# Patient Record
Sex: FEMALE | Race: OTHER | HISPANIC OR LATINO | URBAN - METROPOLITAN AREA
[De-identification: names, ages, dates, MRNs, and addresses within clinical notes are randomized per-mention and may not be internally consistent; named-entity substitution may affect disease eponyms.]

---

## 2019-01-07 ENCOUNTER — EMERGENCY (EMERGENCY)
Facility: HOSPITAL | Age: 19
LOS: 0 days | Discharge: HOME | End: 2019-01-08
Attending: EMERGENCY MEDICINE | Admitting: EMERGENCY MEDICINE
Payer: MEDICAID

## 2019-01-07 DIAGNOSIS — L29.9 PRURITUS, UNSPECIFIED: ICD-10-CM

## 2019-01-07 DIAGNOSIS — L50.9 URTICARIA, UNSPECIFIED: ICD-10-CM

## 2019-01-07 DIAGNOSIS — R21 RASH AND OTHER NONSPECIFIC SKIN ERUPTION: ICD-10-CM

## 2019-01-07 DIAGNOSIS — Z79.899 OTHER LONG TERM (CURRENT) DRUG THERAPY: ICD-10-CM

## 2019-01-07 PROCEDURE — 99282 EMERGENCY DEPT VISIT SF MDM: CPT

## 2019-01-08 VITALS
OXYGEN SATURATION: 98 % | DIASTOLIC BLOOD PRESSURE: 88 MMHG | SYSTOLIC BLOOD PRESSURE: 146 MMHG | TEMPERATURE: 98 F | RESPIRATION RATE: 17 BRPM | HEART RATE: 91 BPM

## 2019-01-08 VITALS — HEIGHT: 62 IN | WEIGHT: 149.91 LBS

## 2019-01-08 RX ORDER — FAMOTIDINE 10 MG/ML
20 INJECTION INTRAVENOUS ONCE
Qty: 0 | Refills: 0 | Status: COMPLETED | OUTPATIENT
Start: 2019-01-08 | End: 2019-01-08

## 2019-01-08 RX ORDER — FAMOTIDINE 10 MG/ML
1 INJECTION INTRAVENOUS
Qty: 20 | Refills: 0 | OUTPATIENT
Start: 2019-01-08 | End: 2019-01-17

## 2019-01-08 RX ORDER — DIPHENHYDRAMINE HCL 50 MG
1 CAPSULE ORAL
Qty: 20 | Refills: 0 | OUTPATIENT
Start: 2019-01-08 | End: 2019-01-17

## 2019-01-08 RX ORDER — DIPHENHYDRAMINE HCL 50 MG
50 CAPSULE ORAL ONCE
Qty: 0 | Refills: 0 | Status: COMPLETED | OUTPATIENT
Start: 2019-01-08 | End: 2019-01-08

## 2019-01-08 RX ADMIN — Medication 50 MILLIGRAM(S): at 01:38

## 2019-01-08 RX ADMIN — FAMOTIDINE 20 MILLIGRAM(S): 10 INJECTION INTRAVENOUS at 01:38

## 2019-01-08 NOTE — ED ADULT NURSE NOTE - CHPI ED NUR SYMPTOMS NEG
no fever/no confusion/no petechia/no chills/no scaly patches on skin/no decreased eating/drinking/no vomiting/no body aches

## 2019-01-08 NOTE — ED ADULT NURSE NOTE - OBJECTIVE STATEMENT
Patient presents to ED c/o itching and rash x 2 hours PTA. Patient states she was at a friend's house when she noticed bilateral arms became itchy and rash started developing. Patient denies any SOB, difficulty breathing, throat itching, throat swelling. Localized, raised rash to right inner/upper arm near armpit noted. SpO2 99% on room air. Patient offers no other complaints. Patient denies any allergies to food and medications. Denies any new detergent use. No edema of the lips, tongue or throat noted.

## 2019-01-08 NOTE — ED ADULT NURSE NOTE - NSIMPLEMENTINTERV_GEN_ALL_ED
Implemented All Universal Safety Interventions:  [place] to call system. Call bell, personal items and telephone within reach. Instruct patient to call for assistance. Room bathroom lighting operational. Non-slip footwear when patient is off stretcher. Physically safe environment: no spills, clutter or unnecessary equipment. Stretcher in lowest position, wheels locked, appropriate side rails in place.

## 2019-01-08 NOTE — ED PROVIDER NOTE - OBJECTIVE STATEMENT
19 y/o female in ED c/o itchy rash x 2 hours PTA.   pt denies anything new.    pt states was at home with friends and developed itchy rash to face then neck and then BUE.   states improving.    pt denies any fever, HA, cp, sob, n/v/d/abd pain.   tolerating PO.

## 2019-01-08 NOTE — ED PROVIDER NOTE - NSFOLLOWUPINSTRUCTIONS_ED_ALL_ED_FT
please follow up with your doctor in 3-5 days.   take medications as prescribed.   return to ED for any concerns

## 2021-03-29 ENCOUNTER — EMERGENCY (EMERGENCY)
Facility: HOSPITAL | Age: 21
LOS: 0 days | Discharge: HOME | End: 2021-03-29
Attending: EMERGENCY MEDICINE
Payer: COMMERCIAL

## 2021-03-29 VITALS
TEMPERATURE: 98 F | HEART RATE: 80 BPM | RESPIRATION RATE: 18 BRPM | WEIGHT: 190.04 LBS | DIASTOLIC BLOOD PRESSURE: 79 MMHG | HEIGHT: 62 IN | SYSTOLIC BLOOD PRESSURE: 141 MMHG | OXYGEN SATURATION: 99 %
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
## 2021-03-29 DIAGNOSIS — R07.89 OTHER CHEST PAIN: ICD-10-CM

## 2021-03-29 DIAGNOSIS — S39.012A STRAIN OF MUSCLE, FASCIA AND TENDON OF LOWER BACK, INITIAL ENCOUNTER: ICD-10-CM
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
## 2021-03-29 DIAGNOSIS — M54.9 DORSALGIA, UNSPECIFIED: ICD-10-CM

## 2021-03-29 DIAGNOSIS — R10.9 UNSPECIFIED ABDOMINAL PAIN: ICD-10-CM

## 2021-03-29 PROCEDURE — 99283 EMERGENCY DEPT VISIT LOW MDM: CPT

## 2021-03-29 PROCEDURE — 93005 ELECTROCARDIOGRAM TRACING: CPT

## 2021-03-29 PROCEDURE — 99284 EMERGENCY DEPT VISIT MOD MDM: CPT

## 2021-03-29 PROCEDURE — 93010 ELECTROCARDIOGRAM REPORT: CPT

## 2021-03-29 RX ORDER — METHOCARBAMOL 500 MG/1
2 TABLET, FILM COATED ORAL
Qty: 30 | Refills: 0
Start: 2021-03-29 | End: 2021-04-02
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
## 2021-03-29 NOTE — ED ADULT NURSE NOTE - NSIMPLEMENTINTERV_GEN_ALL_ED
Implemented All Universal Safety Interventions:  [place] to call system. Call bell, personal items and telephone within reach. Instruct patient to call for assistance. Room bathroom lighting operational. Non-slip footwear when patient is off stretcher. Physically safe environment: no spills, clutter or unnecessary equipment. Stretcher in lowest position, wheels locked, appropriate side rails in place.
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
## 2021-03-29 NOTE — ED STATDOCS - GASTROINTESTINAL, MLM
OB/GYN Resident Interval Note    Patient seen in recovery. Vitals showing that she is tachycardic in 115s with blood pressures 80-90s/40-60s. She states she is not lightheaded, is not dizzy, denies any chest pain or SOB. Reports she feels cold. Fundus firm, no brisk vaginal bleeding with fundal palpation noted     Estimated blood loss during surgery was approx 900 mL   Will order stat CBC, CMP, PT, PTT, Fibrinogen, LA and start 500 mL LR bolus.        Vitals:    11/30/20 1800 11/30/20 1935 11/30/20 1940 11/30/20 1950   BP: 125/70 (!) 88/41 108/67 101/63   Pulse: 81 104 60 81   Resp: 16 18  18   Temp:  97.4 °F (36.3 °C)     TempSrc:  Oral     SpO2:  99%     Weight:       Height:           Coty Fitzgerald DO   OB/GYN Resident  Pager [phone] [facility]  11/30/2020, 8:25 PM abdomen soft, non-tender, no rebound or guarding, +small ecchymosis below umbilicus
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
## 2021-03-29 NOTE — ED STATDOCS - PATIENT PORTAL LINK FT
You can access the FollowMyHealth Patient Portal offered by [facility] by registering at the following website: [email or web address]. By joining [employer]’s FollowMyHealth portal, you will also be able to view your health information using other applications (apps) compatible with our system.
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
## 2021-08-10 ENCOUNTER — EMERGENCY (EMERGENCY)
Facility: HOSPITAL | Age: 21
LOS: 0 days | Discharge: HOME | End: 2021-08-10
Attending: EMERGENCY MEDICINE
Payer: MEDICAID

## 2021-08-10 VITALS
OXYGEN SATURATION: 98 % | DIASTOLIC BLOOD PRESSURE: 80 MMHG | RESPIRATION RATE: 18 BRPM | HEART RATE: 71 BPM | SYSTOLIC BLOOD PRESSURE: 120 MMHG

## 2021-08-10 VITALS — HEIGHT: 62 IN | WEIGHT: 223.99 LBS

## 2021-08-10 DIAGNOSIS — O26.859 SPOTTING COMPLICATING PREGNANCY, UNSPECIFIED TRIMESTER: ICD-10-CM

## 2021-08-10 DIAGNOSIS — R10.30 LOWER ABDOMINAL PAIN, UNSPECIFIED: ICD-10-CM

## 2021-08-10 LAB
ANION GAP SERPL CALC-SCNC: 6 MMOL/L — SIGNIFICANT CHANGE UP (ref 5–17)
APPEARANCE UR: ABNORMAL
APTT BLD: 30.2 SEC — SIGNIFICANT CHANGE UP (ref 27.5–35.5)
BASOPHILS # BLD AUTO: 0.05 K/UL — SIGNIFICANT CHANGE UP (ref 0–0.2)
BASOPHILS NFR BLD AUTO: 0.4 % — SIGNIFICANT CHANGE UP (ref 0–2)
BILIRUB UR-MCNC: NEGATIVE — SIGNIFICANT CHANGE UP
BUN SERPL-MCNC: 12 MG/DL — SIGNIFICANT CHANGE UP (ref 7–23)
CALCIUM SERPL-MCNC: 9.3 MG/DL — SIGNIFICANT CHANGE UP (ref 8.5–10.1)
CHLORIDE SERPL-SCNC: 105 MMOL/L — SIGNIFICANT CHANGE UP (ref 96–108)
CO2 SERPL-SCNC: 24 MMOL/L — SIGNIFICANT CHANGE UP (ref 22–31)
COLOR SPEC: ABNORMAL
CREAT SERPL-MCNC: 0.45 MG/DL — LOW (ref 0.5–1.3)
DIFF PNL FLD: ABNORMAL
EOSINOPHIL # BLD AUTO: 0.16 K/UL — SIGNIFICANT CHANGE UP (ref 0–0.5)
EOSINOPHIL NFR BLD AUTO: 1.3 % — SIGNIFICANT CHANGE UP (ref 0–6)
GLUCOSE SERPL-MCNC: 93 MG/DL — SIGNIFICANT CHANGE UP (ref 70–99)
GLUCOSE UR QL: NEGATIVE MG/DL — SIGNIFICANT CHANGE UP
HCG SERPL-ACNC: 3721 MIU/ML — HIGH
HCT VFR BLD CALC: 40.4 % — SIGNIFICANT CHANGE UP (ref 34.5–45)
HGB BLD-MCNC: 13.9 G/DL — SIGNIFICANT CHANGE UP (ref 11.5–15.5)
IMM GRANULOCYTES NFR BLD AUTO: 0.3 % — SIGNIFICANT CHANGE UP (ref 0–1.5)
INR BLD: 1.01 RATIO — SIGNIFICANT CHANGE UP (ref 0.88–1.16)
KETONES UR-MCNC: ABNORMAL
LEUKOCYTE ESTERASE UR-ACNC: ABNORMAL
LYMPHOCYTES # BLD AUTO: 18.2 % — SIGNIFICANT CHANGE UP (ref 13–44)
LYMPHOCYTES # BLD AUTO: 2.3 K/UL — SIGNIFICANT CHANGE UP (ref 1–3.3)
MCHC RBC-ENTMCNC: 31 PG — SIGNIFICANT CHANGE UP (ref 27–34)
MCHC RBC-ENTMCNC: 34.4 GM/DL — SIGNIFICANT CHANGE UP (ref 32–36)
MCV RBC AUTO: 90 FL — SIGNIFICANT CHANGE UP (ref 80–100)
MONOCYTES # BLD AUTO: 0.72 K/UL — SIGNIFICANT CHANGE UP (ref 0–0.9)
MONOCYTES NFR BLD AUTO: 5.7 % — SIGNIFICANT CHANGE UP (ref 2–14)
NEUTROPHILS # BLD AUTO: 9.34 K/UL — HIGH (ref 1.8–7.4)
NEUTROPHILS NFR BLD AUTO: 74.1 % — SIGNIFICANT CHANGE UP (ref 43–77)
NITRITE UR-MCNC: POSITIVE
PH UR: 5 — SIGNIFICANT CHANGE UP (ref 5–8)
PLATELET # BLD AUTO: 271 K/UL — SIGNIFICANT CHANGE UP (ref 150–400)
POTASSIUM SERPL-MCNC: 4.1 MMOL/L — SIGNIFICANT CHANGE UP (ref 3.5–5.3)
POTASSIUM SERPL-SCNC: 4.1 MMOL/L — SIGNIFICANT CHANGE UP (ref 3.5–5.3)
PROT UR-MCNC: 100 MG/DL
PROTHROM AB SERPL-ACNC: 11.8 SEC — SIGNIFICANT CHANGE UP (ref 10.6–13.6)
RBC # BLD: 4.49 M/UL — SIGNIFICANT CHANGE UP (ref 3.8–5.2)
RBC # FLD: 12.6 % — SIGNIFICANT CHANGE UP (ref 10.3–14.5)
SODIUM SERPL-SCNC: 135 MMOL/L — SIGNIFICANT CHANGE UP (ref 135–145)
SP GR SPEC: 1.02 — SIGNIFICANT CHANGE UP (ref 1.01–1.02)
UROBILINOGEN FLD QL: NEGATIVE MG/DL — SIGNIFICANT CHANGE UP
WBC # BLD: 12.61 K/UL — HIGH (ref 3.8–10.5)
WBC # FLD AUTO: 12.61 K/UL — HIGH (ref 3.8–10.5)

## 2021-08-10 PROCEDURE — 86901 BLOOD TYPING SEROLOGIC RH(D): CPT

## 2021-08-10 PROCEDURE — 76817 TRANSVAGINAL US OBSTETRIC: CPT | Mod: 26

## 2021-08-10 PROCEDURE — 86850 RBC ANTIBODY SCREEN: CPT

## 2021-08-10 PROCEDURE — 85025 COMPLETE CBC W/AUTO DIFF WBC: CPT

## 2021-08-10 PROCEDURE — 99284 EMERGENCY DEPT VISIT MOD MDM: CPT | Mod: 25

## 2021-08-10 PROCEDURE — 76817 TRANSVAGINAL US OBSTETRIC: CPT

## 2021-08-10 PROCEDURE — 81001 URINALYSIS AUTO W/SCOPE: CPT

## 2021-08-10 PROCEDURE — 86900 BLOOD TYPING SEROLOGIC ABO: CPT

## 2021-08-10 PROCEDURE — 85610 PROTHROMBIN TIME: CPT

## 2021-08-10 PROCEDURE — 36415 COLL VENOUS BLD VENIPUNCTURE: CPT

## 2021-08-10 PROCEDURE — 87086 URINE CULTURE/COLONY COUNT: CPT

## 2021-08-10 PROCEDURE — 85730 THROMBOPLASTIN TIME PARTIAL: CPT

## 2021-08-10 PROCEDURE — 84702 CHORIONIC GONADOTROPIN TEST: CPT

## 2021-08-10 PROCEDURE — 99284 EMERGENCY DEPT VISIT MOD MDM: CPT

## 2021-08-10 PROCEDURE — 87186 SC STD MICRODIL/AGAR DIL: CPT

## 2021-08-10 PROCEDURE — 80048 BASIC METABOLIC PNL TOTAL CA: CPT

## 2021-08-10 RX ORDER — NITROFURANTOIN MACROCRYSTAL 50 MG
100 CAPSULE ORAL
Refills: 0 | Status: DISCONTINUED | OUTPATIENT
Start: 2021-08-10 | End: 2021-08-10

## 2021-08-10 RX ADMIN — Medication 100 MILLIGRAM(S): at 10:45

## 2021-08-10 NOTE — ED PROVIDER NOTE - PATIENT PORTAL LINK FT
You can access the FollowMyHealth Patient Portal offered by [facility] by registering at the following website: [email or web address]. By joining [employer]’s FollowMyHealth portal, you will also be able to view your health information using other applications (apps) compatible with our system.

## 2021-08-10 NOTE — ED ADULT TRIAGE NOTE - CHIEF COMPLAINT QUOTE
c/o abdominal cramping and vaginal bleeding since 8/9/21.  Started with spotting which progressed to more bleeding.  Pt states they are approximately 7 weeks pregnant.  Some abdominal pain noted.

## 2021-08-10 NOTE — ED PROVIDER NOTE - OBJECTIVE STATEMENT
Pt is a 21 year old female who comes to the Ed complaining of lower abdominal cramping and pain. Pt states she also had vaginal bleeding starting two weeks ago. States went to [facility] and was told she was pregnant. States that she has had labs and US several times with Pt is a 21 year old female who comes to the Ed complaining of lower abdominal cramping and pain. Pt states she also had vaginal bleeding/spotting starting two weeks ago.  went to [facility] clinic and was told she was pregnant.  that she has had labs and US several times with last one being yesterday and was told she may be having a miscarriage.  Today had increased bleeding and came for eval. No fevers

## 2021-08-10 NOTE — ED ADULT NURSE NOTE - OBJECTIVE STATEMENT
Pt reports that she had positive home pregnancy test over one month ago. Pt reports that she started spotting and then noted more vaginal bleeding.  Pt denies injury.  Pt reports prior hx of  with subsequent irregular menses.

## 2021-08-13 RX ORDER — NITROFURANTOIN MACROCRYSTAL 50 MG
1 CAPSULE ORAL
Qty: 14 | Refills: 0
Start: 2021-08-13 | End: 2021-08-19

## 2021-08-15 ENCOUNTER — EMERGENCY (EMERGENCY)
Facility: HOSPITAL | Age: 21
LOS: 0 days | Discharge: HOME | End: 2021-08-15
Attending: EMERGENCY MEDICINE
Payer: MEDICAID

## 2021-08-15 VITALS
RESPIRATION RATE: 16 BRPM | DIASTOLIC BLOOD PRESSURE: 57 MMHG | HEART RATE: 69 BPM | SYSTOLIC BLOOD PRESSURE: 119 MMHG | OXYGEN SATURATION: 99 % | TEMPERATURE: 98 F

## 2021-08-15 VITALS — HEIGHT: 62 IN | WEIGHT: 220.02 LBS

## 2021-08-15 DIAGNOSIS — O03.1 DELAYED OR EXCESSIVE HEMORRHAGE FOLLOWING INCOMPLETE SPONTANEOUS ABORTION: ICD-10-CM

## 2021-08-15 DIAGNOSIS — O99.891 OTHER SPECIFIED DISEASES AND CONDITIONS COMPLICATING PREGNANCY: ICD-10-CM

## 2021-08-15 DIAGNOSIS — R10.32 LEFT LOWER QUADRANT PAIN: ICD-10-CM

## 2021-08-15 DIAGNOSIS — R10.9 UNSPECIFIED ABDOMINAL PAIN: ICD-10-CM

## 2021-08-15 DIAGNOSIS — O03.4 INCOMPLETE SPONTANEOUS ABORTION WITHOUT COMPLICATION: ICD-10-CM

## 2021-08-15 DIAGNOSIS — Z91.040 LATEX ALLERGY STATUS: ICD-10-CM

## 2021-08-15 DIAGNOSIS — R11.0 NAUSEA: ICD-10-CM

## 2021-08-15 LAB
ALBUMIN SERPL ELPH-MCNC: 4 G/DL — SIGNIFICANT CHANGE UP (ref 3.3–5)
ALP SERPL-CCNC: 57 U/L — SIGNIFICANT CHANGE UP (ref 40–120)
ALT FLD-CCNC: 37 U/L — SIGNIFICANT CHANGE UP (ref 12–78)
ANION GAP SERPL CALC-SCNC: 6 MMOL/L — SIGNIFICANT CHANGE UP (ref 5–17)
AST SERPL-CCNC: 18 U/L — SIGNIFICANT CHANGE UP (ref 15–37)
BASOPHILS # BLD AUTO: 0.05 K/UL — SIGNIFICANT CHANGE UP (ref 0–0.2)
BASOPHILS NFR BLD AUTO: 0.3 % — SIGNIFICANT CHANGE UP (ref 0–2)
BILIRUB SERPL-MCNC: 0.4 MG/DL — SIGNIFICANT CHANGE UP (ref 0.2–1.2)
BUN SERPL-MCNC: 11 MG/DL — SIGNIFICANT CHANGE UP (ref 7–23)
CALCIUM SERPL-MCNC: 8.9 MG/DL — SIGNIFICANT CHANGE UP (ref 8.5–10.1)
CHLORIDE SERPL-SCNC: 105 MMOL/L — SIGNIFICANT CHANGE UP (ref 96–108)
CO2 SERPL-SCNC: 27 MMOL/L — SIGNIFICANT CHANGE UP (ref 22–31)
CREAT SERPL-MCNC: 0.55 MG/DL — SIGNIFICANT CHANGE UP (ref 0.5–1.3)
EOSINOPHIL # BLD AUTO: 0.18 K/UL — SIGNIFICANT CHANGE UP (ref 0–0.5)
EOSINOPHIL NFR BLD AUTO: 1.2 % — SIGNIFICANT CHANGE UP (ref 0–6)
GLUCOSE SERPL-MCNC: 99 MG/DL — SIGNIFICANT CHANGE UP (ref 70–99)
HCG SERPL-ACNC: 1703 MIU/ML — HIGH
HCT VFR BLD CALC: 37.9 % — SIGNIFICANT CHANGE UP (ref 34.5–45)
HGB BLD-MCNC: 13 G/DL — SIGNIFICANT CHANGE UP (ref 11.5–15.5)
IMM GRANULOCYTES NFR BLD AUTO: 0.3 % — SIGNIFICANT CHANGE UP (ref 0–1.5)
LYMPHOCYTES # BLD AUTO: 1.61 K/UL — SIGNIFICANT CHANGE UP (ref 1–3.3)
LYMPHOCYTES # BLD AUTO: 11 % — LOW (ref 13–44)
MCHC RBC-ENTMCNC: 31 PG — SIGNIFICANT CHANGE UP (ref 27–34)
MCHC RBC-ENTMCNC: 34.3 GM/DL — SIGNIFICANT CHANGE UP (ref 32–36)
MCV RBC AUTO: 90.5 FL — SIGNIFICANT CHANGE UP (ref 80–100)
MONOCYTES # BLD AUTO: 0.66 K/UL — SIGNIFICANT CHANGE UP (ref 0–0.9)
MONOCYTES NFR BLD AUTO: 4.5 % — SIGNIFICANT CHANGE UP (ref 2–14)
NEUTROPHILS # BLD AUTO: 12.03 K/UL — HIGH (ref 1.8–7.4)
NEUTROPHILS NFR BLD AUTO: 82.7 % — HIGH (ref 43–77)
PLATELET # BLD AUTO: 264 K/UL — SIGNIFICANT CHANGE UP (ref 150–400)
POTASSIUM SERPL-MCNC: 4.3 MMOL/L — SIGNIFICANT CHANGE UP (ref 3.5–5.3)
POTASSIUM SERPL-SCNC: 4.3 MMOL/L — SIGNIFICANT CHANGE UP (ref 3.5–5.3)
PROT SERPL-MCNC: 7.1 GM/DL — SIGNIFICANT CHANGE UP (ref 6–8.3)
RBC # BLD: 4.19 M/UL — SIGNIFICANT CHANGE UP (ref 3.8–5.2)
RBC # FLD: 12.5 % — SIGNIFICANT CHANGE UP (ref 10.3–14.5)
SODIUM SERPL-SCNC: 138 MMOL/L — SIGNIFICANT CHANGE UP (ref 135–145)
WBC # BLD: 14.58 K/UL — HIGH (ref 3.8–10.5)
WBC # FLD AUTO: 14.58 K/UL — HIGH (ref 3.8–10.5)

## 2021-08-15 PROCEDURE — 86900 BLOOD TYPING SEROLOGIC ABO: CPT

## 2021-08-15 PROCEDURE — 86901 BLOOD TYPING SEROLOGIC RH(D): CPT

## 2021-08-15 PROCEDURE — U0005: CPT

## 2021-08-15 PROCEDURE — 80053 COMPREHEN METABOLIC PANEL: CPT

## 2021-08-15 PROCEDURE — 84702 CHORIONIC GONADOTROPIN TEST: CPT

## 2021-08-15 PROCEDURE — 86850 RBC ANTIBODY SCREEN: CPT

## 2021-08-15 PROCEDURE — 85025 COMPLETE CBC W/AUTO DIFF WBC: CPT

## 2021-08-15 PROCEDURE — 76817 TRANSVAGINAL US OBSTETRIC: CPT | Mod: 26

## 2021-08-15 PROCEDURE — 96374 THER/PROPH/DIAG INJ IV PUSH: CPT

## 2021-08-15 PROCEDURE — 76817 TRANSVAGINAL US OBSTETRIC: CPT

## 2021-08-15 PROCEDURE — 99284 EMERGENCY DEPT VISIT MOD MDM: CPT | Mod: 25

## 2021-08-15 PROCEDURE — 36415 COLL VENOUS BLD VENIPUNCTURE: CPT

## 2021-08-15 PROCEDURE — 96376 TX/PRO/DX INJ SAME DRUG ADON: CPT

## 2021-08-15 PROCEDURE — U0003: CPT

## 2021-08-15 PROCEDURE — 99285 EMERGENCY DEPT VISIT HI MDM: CPT

## 2021-08-15 PROCEDURE — 96361 HYDRATE IV INFUSION ADD-ON: CPT

## 2021-08-15 PROCEDURE — 99283 EMERGENCY DEPT VISIT LOW MDM: CPT

## 2021-08-15 RX ORDER — KETOROLAC TROMETHAMINE 30 MG/ML
30 SYRINGE (ML) INJECTION ONCE
Refills: 0 | Status: DISCONTINUED | OUTPATIENT
Start: 2021-08-15 | End: 2021-08-15

## 2021-08-15 RX ORDER — SODIUM CHLORIDE 9 MG/ML
1000 INJECTION INTRAMUSCULAR; INTRAVENOUS; SUBCUTANEOUS ONCE
Refills: 0 | Status: COMPLETED | OUTPATIENT
Start: 2021-08-15 | End: 2021-08-15

## 2021-08-15 RX ORDER — IBUPROFEN 200 MG
1 TABLET ORAL
Qty: 6 | Refills: 0
Start: 2021-08-15 | End: 2021-08-16

## 2021-08-15 RX ORDER — MISOPROSTOL 200 UG/1
3 TABLET ORAL
Qty: 6 | Refills: 0
Start: 2021-08-15 | End: 2021-08-15

## 2021-08-15 RX ADMIN — SODIUM CHLORIDE 1000 MILLILITER(S): 9 INJECTION INTRAMUSCULAR; INTRAVENOUS; SUBCUTANEOUS at 11:25

## 2021-08-15 RX ADMIN — SODIUM CHLORIDE 1000 MILLILITER(S): 9 INJECTION INTRAMUSCULAR; INTRAVENOUS; SUBCUTANEOUS at 10:24

## 2021-08-15 RX ADMIN — Medication 30 MILLIGRAM(S): at 15:30

## 2021-08-15 RX ADMIN — Medication 30 MILLIGRAM(S): at 11:01

## 2021-08-15 NOTE — ED PROVIDER NOTE - PATIENT PORTAL LINK FT
You can access the FollowMyHealth Patient Portal offered by [facility] by registering at the following website: [email or web address]. By joining [employer]’s FollowMyHealth portal, you will also be able to view your health information using other applications (apps) compatible with our system.

## 2021-08-15 NOTE — CONSULT NOTE ADULT - ATTENDING COMMENTS
22 yo  with incomplete Ab.  Dx with Missed Ab ~ 5 days ago.  HCG downtrending.  Patient presented to ED secondary to heavy vaginal bleeding.  Reports bleeding is now minimal.  Thickened endometrium on today's Us.    Vital signs normal and stable h&h.    pelvic exam unremarkable.  Vaginal vault without blood clots and no active bleeding from cervix.    I agree with resident assessment and plan.    Discharge patient home with cytotec for medical curettage.  ER warnings given.  Follow up with [facility]         Mamadou Riley MD

## 2021-08-15 NOTE — ED PROVIDER NOTE - CARE PROVIDER_API CALL
Nandini Bose)  Obstetrics and Gynecology  [address] [place] [address]  Phone: [phone]  Fax: [phone]  Follow Up Time:

## 2021-08-15 NOTE — ED ADULT NURSE NOTE - OBJECTIVE STATEMENT
pt arrives to ED complaining of abdominal pain with vaginal bleeding. pt told she is having a miscarraige. pt denies cp, sob, lightheaded, dizziness. pain described as sharp, constant. alert and oriented x 4. ambulatory.

## 2021-08-15 NOTE — CONSULT NOTE ADULT - ASSESSMENT
20 yo Female  presents to the ED with vaginal bleeding, seen in [facility] ED 5 days ago, diagnosed with miscarriage, returns to ED with worsening vaginal bleeding.  -Patient hemodynamically stable, /79, H/H 13.0/37.9  -Pelvic pain improving s/p Toradol in ED  -Cytotec to progress miscarriage  -Ibuprofen 600mg q6h PRN for moderate pain upon discharge  -Follow up with OBGYN within 1* week after discharge    **INCOMPLETE 20 yo Female  presents to the ED with vaginal bleeding, seen in [facility] ED 5 days ago, diagnosed with miscarriage, returns to ED with worsening vaginal bleeding.  -b-HCG downtrending from 5 days ago, repeat transvaginal US consistent with miscarriage  -Patient hemodynamically stable, /79, H/H 13.0/37.9  -Pelvic pain improving s/p Toradol in ED  -Cytotec to progress miscarriage  -Ibuprofen 600mg q6h PRN for moderate pain upon discharge  -Follow up with OBGYN within 1* week after discharge    **INCOMPLETE 22 yo Female  presents to the ED with vaginal bleeding, seen in [facility] ED 5 days ago, diagnosed with miscarriage, returns to ED with worsening vaginal bleeding.  -b-HCG downtrending from 5 days ago, repeat transvaginal US consistent with miscarriage  -Patient hemodynamically stable, /79, H/H 13.0/37.9  -Prescription sent for Cytotec 600mcg buccally or vaginally to progress miscarriage, instructions given to patient to place at home  -Prescription sent for Ibuprofen 800mg q8h PRN for moderate pain upon discharge  -Information for [facility] given to patient  -Patient optimized from OBGYN standpoint for discharge home with close outpatient follow up  -Discussed with Attending Dr. Riley      20 yo Female  presents to the ED with vaginal bleeding, seen in [facility] ED 5 days ago, diagnosed with miscarriage, returns to ED with worsening vaginal bleeding.  -b-HCG downtrending from 5 days ago, repeat transvaginal US consistent with miscarriage  -Patient hemodynamically stable, /79, H/H 13.0/37.9  -Prescription sent for Cytotec 600mcg buccally or vaginally to progress miscarriage, instructions given to patient to place at home  -Prescription sent for Ibuprofen 800mg q8h PRN for moderate pain upon discharge  -Information for [facility] given to patient  -Patient optimized from OBGYN standpoint for discharge home with close outpatient follow up  -Discussed with Attending Dr. Riley, agrees with plan

## 2021-08-15 NOTE — ED PROVIDER NOTE - NSFOLLOWUPINSTRUCTIONS_ED_ALL_ED_FT
Tylenol 650 mg every 6 hours as needed for pain.  Take Cytotec as prescribed.  Follow up [facility]: call office tomorrow for appointment.  Return to ED if fever, severe abdominal/pelvic pain, severe bleeding from vagina, passing out, or other concern.      Incomplete Miscarriage      A miscarriage is the loss of an unborn baby (fetus) before the 20th week of pregnancy. In an incomplete miscarriage, parts of the fetus or placenta (afterbirth) remain in the body. Most miscarriages happen in the first 3 months of pregnancy. Sometimes, it happens before a woman even knows she is pregnant.    Having a miscarriage can be an emotional experience. If you have had a miscarriage, talk with your health care provider about any questions you may have about miscarrying, the grieving process, and your future pregnancy plans.      What are the causes?  This condition may be caused by:  •Problems with the genes or chromosomes that make it impossible for the baby to develop normally. These problems are most often the result of random errors that occur early in development, and are not passed from parent to child (not inherited).      •Infection of the cervix or uterus.      •Conditions that affect hormone balance in the body.      •Problems with the cervix, such as the cervix opening and thinning before pregnancy is at term (cervical insufficiency).      •Problems with the uterus, such as a uterus with an abnormal shape, fibroids in the uterus, or problems that were present from birth (congenital abnormalities).      •Certain medical conditions.      •Smoking, drinking alcohol, or using drugs.      •Injury (trauma).      Many times, the cause of a miscarriage is not known.      What are the signs or symptoms?  Symptoms of this condition include:  •Vaginal bleeding or spotting, with or without cramps or pain.      •Pain or cramping in the abdomen or lower back.      •Passing fluid, tissue, or blood clots from the vagina.        How is this diagnosed?  This condition may be diagnosed based on:  •A physical exam.      •Ultrasound.      •Blood tests.      •Urine tests.        How is this treated?  An incomplete miscarriage may be treated with:  •Dilation and curettage (D&C). This is a procedure in which the cervix is stretched open and the lining of the uterus (endometrium) is scraped to remove any remaining tissue from the pregnancy.    •Medicines, such as:  •Antibiotic medicine to treat infection.      •Medicine to help any remaining tissue pass out of your uterus.      •Medicine to reduce (contract) the size of the uterus. These medicines may be given if you have a lot of bleeding.        If you have Rh negative blood and your baby was Rh positive, you will need a shot of medicine called Rh immunoglobulinto protect future babies from Rh blood problems. "Rh-negative" and "Rh-positive" refer to whether or not the blood has a specific protein found on the surface of red blood cells (Rh factor).      Follow these instructions at home:      Medicines      •Take over-the-counter and prescription medicines only as told by your health care provider.      •If you were prescribed antibiotic medicine, take your antibiotic as told by your health care provider. Do not stop taking the antibiotic even if you start to feel better.      • Do not take NSAIDs, such as aspirin and ibuprofen, unless approved by your doctor. These medicines can cause bleeding.      Activity     •Rest as directed. Ask your health care provider what activities are safe for you.      •Have someone help with home and family responsibilities during this time.      General instructions     •Keep track of the number of sanitary pads you use each day and how soaked (saturated) they are. Write down this information.      •Monitor the amount of tissue or blood clots that you pass from your vagina. Save any large amounts of tissue for your health care provider to examine.      • Do not use tampons, douche, or have sex until your health care provider approves.      •To help you and your partner with the process of grieving, talk with your health care provider or seek counseling to help cope with the pregnancy loss.      •When you are ready, meet with your health care provider to discuss important steps you should take for your health, as well as steps to take in order to have a healthy pregnancy in the future.      •Keep all follow-up visits as told by your health care provider. This is important.        Where to find more information    •The American Congress of Obstetricians and Gynecologists: www.acog.org      •[place] Department of Health and Human Services Office of Women’s Health: www.womenshealth.gov        Contact a health care provider if:    •You have a fever or chills.      •You have a foul smelling vaginal discharge.        Get help right away if:    •You have severe cramps or pain in your back or abdomen.      •You pass walnut-sized (or larger) blood clots or tissue from your vagina.      •You have heavy bleeding, soaking more than 1 regular sanitary pad in an hour.      •You become lightheaded or weak.      •You pass out.      •You have feelings of sadness that take over your thoughts, or you have thoughts of hurting yourself.        Summary    •In an incomplete miscarriage, parts of the fetus or placenta (afterbirth) remain in the body.      •There are multiple treatment options for an incomplete miscarriage, talk to your health care provider about the best option for you.      •Follow your health care provider's instructions for follow-up care.      •To help you and your partner with the process of grieving, talk with your health care provider or seek counseling to help cope with the pregnancy loss.      This information is not intended to replace advice given to you by your health care provider. Make sure you discuss any questions you have with your health care provider.

## 2021-08-15 NOTE — ED PROVIDER NOTE - ENMT, MLM
Oral pharynx clear. Airway patent, Nasal mucosa clear. Mouth with mildly dry mucosa. Throat has no vesicles, no oropharyngeal exudates and uvula is midline.

## 2021-08-15 NOTE — CONSULT NOTE ADULT - SUBJECTIVE AND OBJECTIVE BOX
HERNAN FORD  21y Female     Patient is a 21y old Female who presents with a chief complaint of vaginal bleeding.    HPI: 22 yo Female  presents to the ED with vaginal bleeding.  8 days ago patient noted slight vaginal spotting that has continued to increase. 5 days ago patient was evaluated at [facility] ED and diagnosed with a miscarriage, OBGYN was not consulted at that time, patient told to follow up with OBGYN outpatient. Patient states she has not yet followed up. Since discharge, vaginal bleeding has progressively worsened. Patient states for past 3 days she has used >10 saturated pads over 24 hour period. States when she goes to the bathroom the whole toilet is filled with blood. Patient denies any inciting event prior to vaginal bleeding starting. This AM patient awakened with sharp left pelvic pain with associated nausea. Patient decided to return to the ED to be evaluated. Patient currently feels improved, states the pain medication she received in the ED has helped her. Denies any other complaints. Denies CP, SOB, nausea, vomiting, diarrhea, HA, changes in vision, dizziness, lightheadedness. Of note, patient was also diagnosed with UTI in ED 5 days ago, prescribed course of Macrobid 100mg BID, has been taking it for the past 3 days. Denies taking any other medications regularly.      PAST MEDICAL & SURGICAL HISTORY:  No pertinent past medical or surgical history        OB/GYN HISTORY:     , patient had an elective  at age 16  Menarche age 13, cycle from age [age] was regular every 28 days, period lasting 5-7 days, after elective  patient reports she gets her menstrual period approx twice per year, last menstrual cycle a few months ago.  Denies previously being diagnosed with fibroids, cysts, STDs  Does not have gynecologist that she regularly follows with                                       FAMILY HISTORY:  No pertinent family history in first degree relatives    SOCIAL HISTORY: Hookah twice per week, social EtOH use, rare marijuana use, denies any other recreational drugs    Allergies  latex (rash, swelling)  No Known Drug Allergies        REVIEW OF SYSTEMS:  CONSTITUTIONAL: No weakness, fevers or chills  HEENT: No headaches, changes in vision  RESPIRATORY: No cough, shortness of breath  CARDIOVASCULAR: No chest pain or palpitations  GASTROINTESTINAL: +improved left pelvic pain, no nausea, vomiting, or hematemesis; No diarrhea or constipation  GENITOURINARY: +significant vaginal bleeding, No dysuria, frequency or hematuria  NEUROLOGICAL: No numbness or weakness  SKIN: No itching, burning, rashes, or lesions       MEDICATIONS  (STANDING):    MEDICATIONS  (PRN):        Vital Signs Last 24 Hrs  T(C): 36.8 (15 Aug 2021 09:31), Max: 36.8 (15 Aug 2021 09:31)  T(F): 98.3 (15 Aug 2021 09:31), Max: 98.3 (15 Aug 2021 09:31)  HR: 81 (15 Aug 2021 09:) (81 - 81)  BP: 142/79 (15 Aug 2021 09:) (142/79 - 142/79)  BP(mean): 91 (15 Aug 2021 09:) (91 - 91)  RR: 20 (15 Aug 2021 09:) (20 - 20)  SpO2: 100% (15 Aug 2021 09:) (100% - 100%)      PHYSICAL EXAM:  Constitutional: NAD, awake and alert, well-developed  HEENT: EOMI, Normal Hearing, MMM  Neck: Soft and supple, No JVD  Respiratory: Breath sounds are clear bilaterally, No wheezing, rales or rhonchi  Cardiovascular: S1 and S2, regular rate and rhythm, no murmurs, gallops or rubs  Gastrointestinal: Bowel Sounds present, soft, nontender, nondistended, no guarding, no rebound  Pelvic:  Extremities: No peripheral edema  Vascular: 2+ dorsalis pedis pulses bilat  Neurological: A/O x 3, answering questions appropriately      LABS:  Hc                        13.0   14.58 )-----------( 264      ( 15 Aug 2021 10:10 )             37.9     08-15    138  |  105  |  11  ----------------------------<  99  4.3   |  27  |  0.55    Ca    8.9      15 Aug 2021 10:10    TPro  7.1  /  Alb  4.0  /  TBili  0.4  /  DBili  x   /  AST  18  /  ALT  37  /  AlkPhos  57  08-15    I&O's Detail          RADIOLOGY & ADDITIONAL STUDIES:  < from: US Transvaginal, OB (08.10.21 @ 09:18) >  EXAM:  US OB TRANSVAGINAL                            PROCEDURE DATE:  08/10/2021          INTERPRETATION:  CLINICAL INFORMATION: Vaginal bleeding.    LMP: 2021    Estimated Gestational Age by LMP: 7 weeks.    COMPARISON: None available.    Endovaginal and transabdominal pelvic sonogram. Color and Spectral Doppler was performed.    FINDINGS:  Uterus: 8.3 x 4.4 x 6.3 cm. Endometrial stripe measures 2.0 cm and contains an intrauterine gestational sac, measuring 1.0 x 1.0 x 0.8 cm with internal echoes. No yolk sac or fetal pole is identified.    Right ovary: 1.8 cm x 2.7 cm x 2.6 cm. Within normal limits. Normal arterial and venous waveforms.  Left ovary: 1.8 cm x 2.3 cm x 3.1 cm. Corpus luteum, measuring 2.0 cm. Normal arterial and venous waveforms.    Fluid: None.    IMPRESSION:  Fluid collection in the uterus with MSD of <16 mm, which may represent an early IUP, pregnancy failure or a pseudosac with ectopic pregnancy. Serial hCG and ultrasound are recommended to determine the significance of these findings.    --- End of Report ---            FUAD MICHEL MD; Attending Radiologist  This document has been electronically signed. Aug 10 2021 12:06PM    < from: US Transvaginal, OB (08.15.21 @ 10:49) >  EXAM:  US OB TRANSVAGINAL                            PROCEDURE DATE:  08/15/2021          INTERPRETATION:  CLINICAL INFORMATION: Pregnant, vaginal bleeding and pelvic pain.    Estimated Gestational Age by LMP: Unknown    COMPARISON: 8/10/2021    Endovaginal and transabdominal pelvic sonogram.    FINDINGS:  Uterus: 8.4 x 4.1 x 4.5 cm. Previously noted fluid collection in the uterus is no longer identified. Endometrium thickened up to 20 mm. No gestational sac identified. No abnormal endometrial vascularity identified. Cervix open and distended with heterogeneous avascular material, likely hemorrhage.    Right ovary: 1.8 cm x 2.3 cm x 1.9 cm. Within normal limits. Normal arterial and venous waveforms.  Left ovary: 1.9 cm x 2.6 cm x 2.9 cm. Within normal limits. Normal arterial and venous waveforms.    Fluid: Trace free pelvic fluid.    IMPRESSION:  No intrauterine or extrauterine gestational sac identified. Previously noted fluid collection in the uterus no longer identified. Thickened endometrium and open cervix distended with hemorrhagic products. Findings are suspicious for  in progress. Occult ectopic pregnancy is not fully excluded. Correlation with beta-hCG and continued trending of beta-hCG is recommended with repeat imaging as warranted.      --- End of Report ---            ALBIN LEO MD; Attending Radiologist  This document has been electronically signed. Aug 15 2021 11:09AM    < end of copied text >   HERNAN FORD  21y Female     Patient is a 21y old Female who presents with a chief complaint of vaginal bleeding.    HPI: 20 yo Female  presents to the ED with vaginal bleeding.  8 days ago patient noted slight vaginal spotting that has continued to increase. 5 days ago patient was evaluated at [facility] ED and diagnosed with a miscarriage, OBGYN was not consulted at that time, patient told to follow up with OBGYN outpatient. Patient states she has not yet followed up. Since discharge, vaginal bleeding has progressively worsened. Patient states for past 3 days she has used >10 saturated pads over 24 hour period. States when she goes to the bathroom the whole toilet is filled with blood. Patient denies any inciting event prior to vaginal bleeding starting. This AM patient awakened with sharp left pelvic pain with associated nausea. Patient decided to return to the ED to be evaluated. Patient currently feels improved, states the pain medication she received in the ED has helped her. Denies any other complaints. Denies CP, SOB, nausea, vomiting, diarrhea, HA, changes in vision, dizziness, lightheadedness. Of note, patient was also diagnosed with UTI in ED 5 days ago, prescribed course of Macrobid 100mg BID, has been taking it for the past 3 days. Denies taking any other medications regularly.      PAST MEDICAL & SURGICAL HISTORY:  No pertinent past medical or surgical history        OB/GYN HISTORY:     , patient had an elective  at age 16  Menarche age 13, cycle from age [age] was regular every 28 days, period lasting 5-7 days, after elective  patient reports menstrual cycle occurs approx twice per year, last menstrual cycle a few months ago.  Denies previously being diagnosed with fibroids, cysts, STIs  Does not have gynecologist that she regularly follows with                                       FAMILY HISTORY:  No pertinent family history in first degree relatives    SOCIAL HISTORY: Hookah twice per week, social EtOH use, rare marijuana use, denies any other recreational drugs    Allergies  latex (rash, swelling)  No Known Drug Allergies        REVIEW OF SYSTEMS:  CONSTITUTIONAL: No weakness, fevers or chills  HEENT: No headaches, changes in vision  RESPIRATORY: No cough, shortness of breath  CARDIOVASCULAR: No chest pain or palpitations  GASTROINTESTINAL: +improved left pelvic pain, no nausea, vomiting, or hematemesis; No diarrhea or constipation  GENITOURINARY: +significant vaginal bleeding, No dysuria, frequency or hematuria  NEUROLOGICAL: No numbness or weakness  SKIN: No itching, burning, rashes, or lesions       MEDICATIONS  (STANDING):    MEDICATIONS  (PRN):        Vital Signs Last 24 Hrs  T(C): 36.8 (15 Aug 2021 09:31), Max: 36.8 (15 Aug 2021 09:)  T(F): 98.3 (15 Aug 2021 09:31), Max: 98.3 (15 Aug 2021 09:31)  HR: 81 (15 Aug 2021 09:) (81 - 81)  BP: 142/79 (15 Aug 2021 09:) (142/79 - 142/79)  BP(mean): 91 (15 Aug 2021 09:) (91 - 91)  RR: 20 (15 Aug 2021 09:) (20 - 20)  SpO2: 100% (15 Aug 2021 09:) (100% - 100%)      PHYSICAL EXAM:  Constitutional: NAD, awake and alert, well-developed  HEENT: EOMI, Normal Hearing, MMM  Neck: Soft and supple, No JVD  Respiratory: Breath sounds are clear bilaterally, No wheezing, rales or rhonchi  Cardiovascular: S1 and S2, regular rate and rhythm, no murmurs, gallops or rubs  Gastrointestinal: Bowel Sounds present, soft, nontender, nondistended, no guarding, no rebound  Pelvic:  Extremities: No peripheral edema  Vascular: 2+ dorsalis pedis pulses bilat  Neurological: A/O x 3, answering questions appropriately      LABS:  Hc                        13.0   14.58 )-----------( 264      ( 15 Aug 2021 10:10 )             37.9     08-15    138  |  105  |  11  ----------------------------<  99  4.3   |  27  |  0.55    Ca    8.9      15 Aug 2021 10:10    TPro  7.1  /  Alb  4.0  /  TBili  0.4  /  DBili  x   /  AST  18  /  ALT  37  /  AlkPhos  57  08-15    I&O's Detail          RADIOLOGY & ADDITIONAL STUDIES:  < from: US Transvaginal, OB (08.10.21 @ 09:18) >  EXAM:  US OB TRANSVAGINAL                            PROCEDURE DATE:  08/10/2021          INTERPRETATION:  CLINICAL INFORMATION: Vaginal bleeding.    LMP: 2021    Estimated Gestational Age by LMP: 7 weeks.    COMPARISON: None available.    Endovaginal and transabdominal pelvic sonogram. Color and Spectral Doppler was performed.    FINDINGS:  Uterus: 8.3 x 4.4 x 6.3 cm. Endometrial stripe measures 2.0 cm and contains an intrauterine gestational sac, measuring 1.0 x 1.0 x 0.8 cm with internal echoes. No yolk sac or fetal pole is identified.    Right ovary: 1.8 cm x 2.7 cm x 2.6 cm. Within normal limits. Normal arterial and venous waveforms.  Left ovary: 1.8 cm x 2.3 cm x 3.1 cm. Corpus luteum, measuring 2.0 cm. Normal arterial and venous waveforms.    Fluid: None.    IMPRESSION:  Fluid collection in the uterus with MSD of <16 mm, which may represent an early IUP, pregnancy failure or a pseudosac with ectopic pregnancy. Serial hCG and ultrasound are recommended to determine the significance of these findings.    --- End of Report ---            FUAD MICHEL MD; Attending Radiologist  This document has been electronically signed. Aug 10 2021 12:06PM    < from: US Transvaginal, OB (08.15.21 @ 10:49) >  EXAM:  US OB TRANSVAGINAL                            PROCEDURE DATE:  08/15/2021          INTERPRETATION:  CLINICAL INFORMATION: Pregnant, vaginal bleeding and pelvic pain.    Estimated Gestational Age by LMP: Unknown    COMPARISON: 8/10/2021    Endovaginal and transabdominal pelvic sonogram.    FINDINGS:  Uterus: 8.4 x 4.1 x 4.5 cm. Previously noted fluid collection in the uterus is no longer identified. Endometrium thickened up to 20 mm. No gestational sac identified. No abnormal endometrial vascularity identified. Cervix open and distended with heterogeneous avascular material, likely hemorrhage.    Right ovary: 1.8 cm x 2.3 cm x 1.9 cm. Within normal limits. Normal arterial and venous waveforms.  Left ovary: 1.9 cm x 2.6 cm x 2.9 cm. Within normal limits. Normal arterial and venous waveforms.    Fluid: Trace free pelvic fluid.    IMPRESSION:  No intrauterine or extrauterine gestational sac identified. Previously noted fluid collection in the uterus no longer identified. Thickened endometrium and open cervix distended with hemorrhagic products. Findings are suspicious for  in progress. Occult ectopic pregnancy is not fully excluded. Correlation with beta-hCG and continued trending of beta-hCG is recommended with repeat imaging as warranted.      --- End of Report ---            ALBIN LEO MD; Attending Radiologist  This document has been electronically signed. Aug 15 2021 11:09AM    < end of copied text >   HERNAN FORD  21y Female     Patient is a 21y old Female who presents with a chief complaint of vaginal bleeding.    HPI: 22 yo Female  presents to the ED with vaginal bleeding.  8 days ago patient noted slight vaginal spotting that has continued to increase. 5 days ago patient was evaluated at [facility] ED and diagnosed with a miscarriage, OBGYN was not consulted at that time, patient told to follow up with OBGYN outpatient. Patient states she has not yet followed up. Since discharge, vaginal bleeding has progressively worsened. Patient states for past 3 days she has used >10 saturated pads over 24 hour period. States when she goes to the bathroom the whole toilet is filled with blood. Patient denies any inciting event prior to vaginal bleeding starting. This AM patient awakened with sharp left pelvic pain with associated nausea. Patient decided to return to the ED to be evaluated. Patient currently feels improved, states the pain medication she received in the ED has helped her. Denies any other complaints. Denies CP, SOB, nausea, vomiting, diarrhea, HA, changes in vision, dizziness, lightheadedness. Of note, patient was also diagnosed with UTI in ED 5 days ago, prescribed course of Macrobid 100mg BID, has been taking it for the past 3 days. Denies taking any other medications regularly.      PAST MEDICAL & SURGICAL HISTORY:  No pertinent past medical or surgical history        OB/GYN HISTORY:     , patient had an elective  at age 16  Menarche age 13, cycle from age [age] was regular every 28 days, period lasting 5-7 days, after elective  patient reports menstrual cycle occurs approx twice per year, last menstrual cycle a few months ago.  Denies previously being diagnosed with fibroids, cysts, STIs  Does not have gynecologist that she regularly follows with                                       FAMILY HISTORY:  No pertinent family history in first degree relatives    SOCIAL HISTORY: Hookah twice per week, social EtOH use, rare marijuana use, denies any other recreational drugs    Allergies  latex (rash, swelling)  No Known Drug Allergies        REVIEW OF SYSTEMS:  CONSTITUTIONAL: No weakness, fevers or chills  HEENT: No headaches, changes in vision  RESPIRATORY: No cough, shortness of breath  CARDIOVASCULAR: No chest pain or palpitations  GASTROINTESTINAL: +improved left pelvic pain, no nausea, vomiting, or hematemesis; No diarrhea or constipation  GENITOURINARY: +significant vaginal bleeding, No dysuria, frequency or hematuria  NEUROLOGICAL: No numbness or weakness  SKIN: No itching, burning, rashes, or lesions       MEDICATIONS  (STANDING):    MEDICATIONS  (PRN):        Vital Signs Last 24 Hrs  T(C): 36.8 (15 Aug 2021 09:), Max: 36.8 (15 Aug 2021 09:)  T(F): 98.3 (15 Aug 2021 09:), Max: 98.3 (15 Aug 2021 09:)  HR: 81 (15 Aug 2021 09:) (81 - 81)  BP: 142/79 (15 Aug 2021 09:) (142/79 - 142/79)  BP(mean): 91 (15 Aug 2021 09:) (91 - 91)  RR: 20 (15 Aug 2021 09:31) (20 - 20)  SpO2: 100% (15 Aug 2021 09:31) (100% - 100%)      PHYSICAL EXAM:  Constitutional: NAD, awake and alert, well-developed  HEENT: EOMI, Normal Hearing, MMM  Neck: Soft and supple, No JVD  Respiratory: Breath sounds are clear bilaterally, No wheezing, rales or rhonchi  Cardiovascular: S1 and S2, regular rate and rhythm, no murmurs, gallops or rubs  Gastrointestinal: Bowel Sounds present, soft, nontender, nondistended, no guarding, no rebound  Pelvic: minimal bleeding noticed in vaginal vault, no sign of hemorrhage  Extremities: No peripheral edema  Vascular: 2+ dorsalis pedis pulses bilat  Neurological: A/O x 3, answering questions appropriately      LABS:  Hc                        13.0   14.58 )-----------( 264      ( 15 Aug 2021 10:10 )             37.9     08-15    138  |  105  |  11  ----------------------------<  99  4.3   |  27  |  0.55    Ca    8.9      15 Aug 2021 10:10    TPro  7.1  /  Alb  4.0  /  TBili  0.4  /  DBili  x   /  AST  18  /  ALT  37  /  AlkPhos  57  08-15    I&O's Detail          RADIOLOGY & ADDITIONAL STUDIES:  < from: US Transvaginal, OB (08.10.21 @ 09:18) >  EXAM:  US OB TRANSVAGINAL                            PROCEDURE DATE:  08/10/2021          INTERPRETATION:  CLINICAL INFORMATION: Vaginal bleeding.    LMP: 2021    Estimated Gestational Age by LMP: 7 weeks.    COMPARISON: None available.    Endovaginal and transabdominal pelvic sonogram. Color and Spectral Doppler was performed.    FINDINGS:  Uterus: 8.3 x 4.4 x 6.3 cm. Endometrial stripe measures 2.0 cm and contains an intrauterine gestational sac, measuring 1.0 x 1.0 x 0.8 cm with internal echoes. No yolk sac or fetal pole is identified.    Right ovary: 1.8 cm x 2.7 cm x 2.6 cm. Within normal limits. Normal arterial and venous waveforms.  Left ovary: 1.8 cm x 2.3 cm x 3.1 cm. Corpus luteum, measuring 2.0 cm. Normal arterial and venous waveforms.    Fluid: None.    IMPRESSION:  Fluid collection in the uterus with MSD of <16 mm, which may represent an early IUP, pregnancy failure or a pseudosac with ectopic pregnancy. Serial hCG and ultrasound are recommended to determine the significance of these findings.    --- End of Report ---            FUAD MICHEL MD; Attending Radiologist  This document has been electronically signed. Aug 10 2021 12:06PM    < from: US Transvaginal, OB (08.15.21 @ 10:49) >  EXAM:  US OB TRANSVAGINAL                            PROCEDURE DATE:  08/15/2021          INTERPRETATION:  CLINICAL INFORMATION: Pregnant, vaginal bleeding and pelvic pain.    Estimated Gestational Age by LMP: Unknown    COMPARISON: 8/10/2021    Endovaginal and transabdominal pelvic sonogram.    FINDINGS:  Uterus: 8.4 x 4.1 x 4.5 cm. Previously noted fluid collection in the uterus is no longer identified. Endometrium thickened up to 20 mm. No gestational sac identified. No abnormal endometrial vascularity identified. Cervix open and distended with heterogeneous avascular material, likely hemorrhage.    Right ovary: 1.8 cm x 2.3 cm x 1.9 cm. Within normal limits. Normal arterial and venous waveforms.  Left ovary: 1.9 cm x 2.6 cm x 2.9 cm. Within normal limits. Normal arterial and venous waveforms.    Fluid: Trace free pelvic fluid.    IMPRESSION:  No intrauterine or extrauterine gestational sac identified. Previously noted fluid collection in the uterus no longer identified. Thickened endometrium and open cervix distended with hemorrhagic products. Findings are suspicious for  in progress. Occult ectopic pregnancy is not fully excluded. Correlation with beta-hCG and continued trending of beta-hCG is recommended with repeat imaging as warranted.      --- End of Report ---            ALBIN LEO MD; Attending Radiologist  This document has been electronically signed. Aug 15 2021 11:09AM    < end of copied text >

## 2021-08-15 NOTE — ED PROVIDER NOTE - OBJECTIVE STATEMENT
20 y/o female presents to the ED c/o heavy vaginal bleeding, abdominal pain and cramping. Patient was recently seen in the ED for abdominal pain s/p ultrasounds at [facility] for a miscarriage. On 8/10/21, the patient spontaneously miscarried. Patient reports no follow up with GYN. Pt does not have a GYN at this time. +nausea. Patient denies dysuria, vomiting, fever, CP or SOB. .

## 2021-08-15 NOTE — ED PROVIDER NOTE - CLINICAL SUMMARY MEDICAL DECISION MAKING FREE TEXT BOX
22 y/o  female, , reported spontaneous ab last week, ambulatory ED c/o persistent pelvic pain with heavy vaginal bleeding. Pt has no OBGYN MD. Vital signs stable, afebrile, +pelvic tenderness, concern for RPOC.  Plan: IV fluid, labs including HCG, T+S, pelvic ultrasound, IV Toradol.

## 2021-08-15 NOTE — ED PROVIDER NOTE - PROGRESS NOTE DETAILS
VAL Rodriguez MD:  Pt cleared by OB/Gyn for D/C, to self-apply Cytotec after pick-up from pharmacy,  tid prn pain, [facility] f/u this upcoming week.   reports pt develops rash from ibuprofen, advised to take Tylenol instead.

## 2023-08-15 NOTE — ED POST DISCHARGE NOTE - DETAILS
Discussed with pt. Will prescribed macrobid and pt to f/u with her OB next after her miscarriage. -Rico Hanks PA-C
-3